# Patient Record
Sex: MALE | Race: WHITE | Employment: FULL TIME | URBAN - METROPOLITAN AREA
[De-identification: names, ages, dates, MRNs, and addresses within clinical notes are randomized per-mention and may not be internally consistent; named-entity substitution may affect disease eponyms.]

---

## 2017-08-26 ENCOUNTER — APPOINTMENT (OUTPATIENT)
Dept: RADIOLOGY | Facility: MEDICAL CENTER | Age: 42
End: 2017-08-26
Attending: EMERGENCY MEDICINE
Payer: OTHER GOVERNMENT

## 2017-08-26 ENCOUNTER — HOSPITAL ENCOUNTER (EMERGENCY)
Facility: MEDICAL CENTER | Age: 42
End: 2017-08-26
Attending: EMERGENCY MEDICINE
Payer: OTHER GOVERNMENT

## 2017-08-26 VITALS
SYSTOLIC BLOOD PRESSURE: 122 MMHG | RESPIRATION RATE: 18 BRPM | WEIGHT: 220.68 LBS | HEART RATE: 72 BPM | BODY MASS INDEX: 31.59 KG/M2 | OXYGEN SATURATION: 98 % | DIASTOLIC BLOOD PRESSURE: 84 MMHG | TEMPERATURE: 97.6 F | HEIGHT: 70 IN

## 2017-08-26 DIAGNOSIS — S28.0XXA CRUSHING INJURY OF CHEST, INITIAL ENCOUNTER: ICD-10-CM

## 2017-08-26 DIAGNOSIS — S29.9XXA CHEST WALL INJURY, INITIAL ENCOUNTER: ICD-10-CM

## 2017-08-26 PROCEDURE — 99284 EMERGENCY DEPT VISIT MOD MDM: CPT

## 2017-08-26 PROCEDURE — 71260 CT THORAX DX C+: CPT

## 2017-08-26 PROCEDURE — 700117 HCHG RX CONTRAST REV CODE 255: Performed by: EMERGENCY MEDICINE

## 2017-08-26 RX ORDER — HYDROCODONE BITARTRATE AND ACETAMINOPHEN 5; 325 MG/1; MG/1
1-2 TABLET ORAL EVERY 4 HOURS PRN
Qty: 5 TAB | Refills: 0 | Status: SHIPPED | OUTPATIENT
Start: 2017-08-26 | End: 2017-12-06

## 2017-08-26 RX ADMIN — IOHEXOL 75 ML: 350 INJECTION, SOLUTION INTRAVENOUS at 14:19

## 2017-08-26 ASSESSMENT — PAIN SCALES - GENERAL
PAINLEVEL_OUTOF10: 5
PAINLEVEL_OUTOF10: 10

## 2017-08-26 NOTE — ED NOTES
Discharge instructions provided with Rx x 1 with work note per ERP.  Verbalized understanding of follow-up care, medication management & reasons to return to ED.  Released in stable condition.

## 2017-08-26 NOTE — ED NOTES
Chief Complaint   Patient presents with   • Chest Pain     Pt BIB self to triage for c/o dropping weight bar (215lbs) on chest. Pt reports hearing crack, pt reports mild SOB/ pain upon inspiration.   bilateral breath sounds present  Explained to pt triage process, made pt aware to tell this RN of any changes/concerns, pt verbalized understanding of process and instructions given. Pt to ER lobby.

## 2017-08-26 NOTE — ED PROVIDER NOTES
"ED Provider Note    CHIEF COMPLAINT  Chief Complaint   Patient presents with   • Chest Pain     Pt BIB self to triage for c/o dropping weight bar (215lbs) on chest. Pt reports hearing crack, pt reports mild SOB/ pain upon inspiration.       HPI  Amor Nava is a 42 y.o. male who presentsFor evaluation of chest pain. The patient was lifting weights today. He was bench pressing 215 pounds when he lost the  with both of his hands and the bar came down across his chest. He is complaining of anterior chest pain and some shortness of breath. He has no abdominal pain. He denies any other injury. He has no numbness or weakness.    REVIEW OF SYSTEMS  See HPI for further details. All other systems are negative.     PAST MEDICAL HISTORY  No past medical history on file.    FAMILY HISTORY  Family History   Problem Relation Age of Onset   • Alcohol/Drug Mother    • Alcohol/Drug Father        SOCIAL HISTORY  Social History     Social History   • Marital status: Unknown     Spouse name: N/A   • Number of children: N/A   • Years of education: N/A     Social History Main Topics   • Smoking status: Former Smoker   • Smokeless tobacco: Never Used   • Alcohol use Yes      Comment: last 2007, alcohol history   • Drug use: No   • Sexual activity: Yes     Partners: Female     Other Topics Concern   • Not on file     Social History Narrative   • No narrative on file       SURGICAL HISTORY  No past surgical history on file.    CURRENT MEDICATIONS  Home Medications    **Home medications have not yet been reviewed for this encounter**         ALLERGIES  No Known Allergies    PHYSICAL EXAM  VITAL SIGNS: /78   Pulse 94   Temp 36.4 °C (97.6 °F)   Resp 16   Ht 1.778 m (5' 10\")   Wt 100.1 kg (220 lb 10.9 oz)   SpO2 98%   BMI 31.66 kg/m²     Constitutional: Well developed, Well nourished,  Non-toxic appearance.   HENT: Normocephalic, Atraumatic.   Eyes: EOMI, Conjunctiva normal, No discharge.   Cardiovascular: Normal heart rate. "   Thorax & Lungs:No respiratory distress.  Skin: Warm, Dry.   Musculoskeletal: Good range of motion in all major joints.  Neurologic: Awake alert, No focal deficits noted.       RADIOLOGY/PROCEDURES  CT-CHEST (THORAX) WITH   Final Result      1.  No evidence of thoracic injury.      2.  Fatty liver.                  COURSE & MEDICAL DECISION MAKING  Pertinent Labs & Imaging studies reviewed. (See chart for details)  This is a 42-year-old here for evaluation after a crush injury to his chest while lifting weights. CT scan of the chest using trauma protocol is obtained. I reviewed this study. Shows no evidence of acute traumatic injury. I discussed results of the tests with the patient. I have explained that he likely has soft tissue injury to include possible muscle strains or cartilage injury. I recommended anti-inflammatories. I will provide him a prescription for 5 Nork O. He will follow up with his primary care provider as needed. I have reviewed his prescription monitoring report. He is discharged home in stable condition.       FINAL IMPRESSION  1. Crush injury to his chest  2. Chest wall injury  3.         Electronically signed by: Ralf Khoury, 8/26/2017 1:36 PM

## 2017-08-26 NOTE — DISCHARGE INSTRUCTIONS
Blunt Chest Trauma  Blunt chest trauma is an injury caused by a blow to the chest. These chest injuries can be very painful. Blunt chest trauma often results in bruised or broken (fractured) ribs. Most cases of bruised and fractured ribs from blunt chest traumas get better after 1 to 3 weeks of rest and pain medicine. Often, the soft tissue in the chest wall is also injured, causing pain and bruising. Internal organs, such as the heart and lungs, may also be injured. Blunt chest trauma can lead to serious medical problems. This injury requires immediate medical care.  CAUSES   · Motor vehicle collisions.  · Falls.  · Physical violence.  · Sports injuries.  SYMPTOMS   · Chest pain. The pain may be worse when you move or breathe deeply.  · Shortness of breath.  · Lightheadedness.  · Bruising.  · Tenderness.  · Swelling.  DIAGNOSIS   Your caregiver will do a physical exam. X-rays may be taken to look for fractures. However, minor rib fractures may not show up on X-rays until a few days after the injury. If a more serious injury is suspected, further imaging tests may be done. This may include ultrasounds, computed tomography (CT) scans, or magnetic resonance imaging (MRI).  TREATMENT   Treatment depends on the severity of your injury. Your caregiver may prescribe pain medicines and deep breathing exercises.  HOME CARE INSTRUCTIONS  · Limit your activities until you can move around without much pain.  · Do not do any strenuous work until your injury is healed.  · Put ice on the injured area.  ¨ Put ice in a plastic bag.  ¨ Place a towel between your skin and the bag.  ¨ Leave the ice on for 15-20 minutes, 03-04 times a day.  · You may wear a rib belt as directed by your caregiver to reduce pain.  · Practice deep breathing as directed by your caregiver to keep your lungs clear.  · Only take over-the-counter or prescription medicines for pain, fever, or discomfort as directed by your caregiver.  SEEK IMMEDIATE MEDICAL  CARE IF:   · You have increasing pain or shortness of breath.  · You cough up blood.  · You have nausea, vomiting, or abdominal pain.  · You have a fever.  · You feel dizzy, weak, or you faint.  MAKE SURE YOU:  · Understand these instructions.  · Will watch your condition.  · Will get help right away if you are not doing well or get worse.     This information is not intended to replace advice given to you by your health care provider. Make sure you discuss any questions you have with your health care provider.     Document Released: 01/25/2006 Document Revised: 01/08/2016 Document Reviewed: 10/03/2012  [facility] Interactive Patient Education ©2016 [facility]

## 2017-10-27 DIAGNOSIS — F33.1 MODERATE EPISODE OF RECURRENT MAJOR DEPRESSIVE DISORDER (HCC): ICD-10-CM

## 2017-10-30 RX ORDER — BUPROPION HYDROCHLORIDE 150 MG/1
TABLET, EXTENDED RELEASE ORAL
Qty: 60 TAB | Refills: 0 | Status: SHIPPED | OUTPATIENT
Start: 2017-10-30 | End: 2017-12-02 | Stop reason: SDUPTHER

## 2017-11-09 DIAGNOSIS — F33.1 MODERATE EPISODE OF RECURRENT MAJOR DEPRESSIVE DISORDER (HCC): ICD-10-CM

## 2017-11-09 RX ORDER — BUPROPION HYDROCHLORIDE 150 MG/1
TABLET, EXTENDED RELEASE ORAL
Qty: 30 TAB | Refills: 0 | Status: SHIPPED | OUTPATIENT
Start: 2017-11-09 | End: 2017-12-06

## 2017-12-02 DIAGNOSIS — F33.1 MODERATE EPISODE OF RECURRENT MAJOR DEPRESSIVE DISORDER (HCC): ICD-10-CM

## 2017-12-04 RX ORDER — BUPROPION HYDROCHLORIDE 150 MG/1
TABLET, EXTENDED RELEASE ORAL
Qty: 60 TAB | Refills: 0 | Status: SHIPPED | OUTPATIENT
Start: 2017-12-04 | End: 2017-12-06 | Stop reason: SDUPTHER

## 2017-12-06 ENCOUNTER — OFFICE VISIT (OUTPATIENT)
Dept: MEDICAL GROUP | Facility: MEDICAL CENTER | Age: 42
End: 2017-12-06
Payer: OTHER GOVERNMENT

## 2017-12-06 VITALS
HEART RATE: 79 BPM | BODY MASS INDEX: 31.92 KG/M2 | OXYGEN SATURATION: 93 % | SYSTOLIC BLOOD PRESSURE: 112 MMHG | WEIGHT: 223 LBS | RESPIRATION RATE: 16 BRPM | HEIGHT: 70 IN | DIASTOLIC BLOOD PRESSURE: 72 MMHG | TEMPERATURE: 97.2 F

## 2017-12-06 DIAGNOSIS — F33.1 MODERATE EPISODE OF RECURRENT MAJOR DEPRESSIVE DISORDER (HCC): ICD-10-CM

## 2017-12-06 PROCEDURE — 99213 OFFICE O/P EST LOW 20 MIN: CPT | Performed by: NURSE PRACTITIONER

## 2017-12-06 RX ORDER — BUPROPION HYDROCHLORIDE 150 MG/1
150 TABLET, EXTENDED RELEASE ORAL 2 TIMES DAILY
Qty: 180 TAB | Refills: 3 | Status: SHIPPED | OUTPATIENT
Start: 2017-12-06 | End: 2019-01-18 | Stop reason: SDUPTHER

## 2017-12-06 ASSESSMENT — PATIENT HEALTH QUESTIONNAIRE - PHQ9: CLINICAL INTERPRETATION OF PHQ2 SCORE: 0

## 2017-12-06 ASSESSMENT — ENCOUNTER SYMPTOMS: DEPRESSION: 1

## 2017-12-06 NOTE — PROGRESS NOTES
"Subjective:      Amor Nava is a 42 y.o. male who presents with Medication Refill (bupropion med)            HPI Amor Nava Is here today for yearly follow-up on depression.    Patient reports he has been taking his Wellbutrin twice a day since he was last seen here a year ago. He feels it helps keep his mood stable and decreases his depression. He would like to stay on the medicine. 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 He has no other complaints today.        Social History   Substance Use Topics   • Smoking status: Former Smoker   • Smokeless tobacco: Never Used   • Alcohol use Yes      Comment: last 2007, alcohol history     Current Outpatient Prescriptions   Medication Sig Dispense Refill   • buPROPion SR (WELLBUTRIN-SR) 150 MG TABLET SR 12 HR sustained-release tablet Take 1 Tab by mouth 2 times a day. 180 Tab 3     No current facility-administered medications for this visit.      Family History   Problem Relation Age of Onset   • Alcohol/Drug Mother    • Alcohol/Drug Father    History reviewed. No pertinent past medical history.    Review of Systems   Psychiatric/Behavioral: Positive for depression.   All other systems reviewed and are negative.         Objective:     /72   Pulse 79   Temp 36.2 °C (97.2 °F)   Resp 16   Ht 1.778 m (5' 10\")   Wt 101.2 kg (223 lb)   SpO2 93%   BMI 32.00 kg/m²      Physical Exam   Constitutional: He is oriented to person, place, and time. He appears well-developed and well-nourished. No distress.   HENT:   Head: Normocephalic and atraumatic.   Right Ear: External ear normal.   Left Ear: External ear normal.   Nose: Nose normal.   Mouth/Throat: Oropharynx is clear and moist.   Eyes: Conjunctivae are normal. Right eye exhibits no discharge. Left eye exhibits no discharge.   Neck: Normal range of motion. Neck supple. No tracheal deviation present. No thyromegaly " present.   Cardiovascular: Normal rate, regular rhythm and normal heart sounds.    No murmur heard.  Pulmonary/Chest: Effort normal and breath sounds normal. No respiratory distress. He has no wheezes. He has no rales.   Lymphadenopathy:     He has no cervical adenopathy.   Neurological: He is alert and oriented to person, place, and time. Coordination normal.   Skin: Skin is warm and dry. No rash noted. He is not diaphoretic. No erythema.   Psychiatric: He has a normal mood and affect. His behavior is normal. Judgment and thought content normal.   Nursing note and vitals reviewed.              Assessment/Plan:     1. Moderate episode of recurrent major depressive disorder (CMS-HCC)  Patient is doing well on antidepressant medication and wishes to continue on this. I have refilled his medicine for the year. He does not feel he needs counseling and denies suicidal or homicidal ideation. I did discuss with him the option of getting lab work done but he declined.  - buPROPion SR (WELLBUTRIN-SR) 150 MG TABLET SR 12 HR sustained-release tablet; Take 1 Tab by mouth 2 times a day.  Dispense: 180 Tab; Refill: 3

## 2019-03-11 DIAGNOSIS — F33.1 MODERATE EPISODE OF RECURRENT MAJOR DEPRESSIVE DISORDER (HCC): ICD-10-CM

## 2019-03-11 RX ORDER — BUPROPION HYDROCHLORIDE 150 MG/1
TABLET, EXTENDED RELEASE ORAL
Qty: 30 TAB | Refills: 0 | Status: SHIPPED | OUTPATIENT
Start: 2019-03-11 | End: 2019-06-18 | Stop reason: SDUPTHER

## 2019-03-12 ENCOUNTER — TELEPHONE (OUTPATIENT)
Dept: MEDICAL GROUP | Facility: MEDICAL CENTER | Age: 44
End: 2019-03-12

## 2019-03-20 DIAGNOSIS — F33.1 MODERATE EPISODE OF RECURRENT MAJOR DEPRESSIVE DISORDER (HCC): ICD-10-CM

## 2019-03-25 ENCOUNTER — TELEPHONE (OUTPATIENT)
Dept: MEDICAL GROUP | Facility: MEDICAL CENTER | Age: 44
End: 2019-03-25

## 2019-04-08 ENCOUNTER — TELEPHONE (OUTPATIENT)
Dept: MEDICAL GROUP | Facility: MEDICAL CENTER | Age: 44
End: 2019-04-08

## 2019-06-18 DIAGNOSIS — F33.1 MODERATE EPISODE OF RECURRENT MAJOR DEPRESSIVE DISORDER (HCC): ICD-10-CM

## 2019-06-18 RX ORDER — BUPROPION HYDROCHLORIDE 150 MG/1
TABLET, EXTENDED RELEASE ORAL
Qty: 30 TAB | Refills: 0 | Status: SHIPPED | OUTPATIENT
Start: 2019-06-18 | End: 2019-06-21 | Stop reason: SDUPTHER

## 2019-06-18 NOTE — TELEPHONE ENCOUNTER
From: Amor Nava  Sent: 6/18/2019 8:44 AM PDT  Subject: Medication Renewal Request    Amor Nava would like a refill of the following medications:     buPROPion SR (WELLBUTRIN-SR) 150 MG TABLET SR 12 HR sustained-release tablet [Scottie Apodaca, A.P.N.]    Preferred pharmacy: [facility] #[id number] - [place], [place] - [address]

## 2019-06-21 ENCOUNTER — OFFICE VISIT (OUTPATIENT)
Dept: MEDICAL GROUP | Facility: MEDICAL CENTER | Age: 44
End: 2019-06-21
Payer: OTHER GOVERNMENT

## 2019-06-21 VITALS
HEART RATE: 77 BPM | RESPIRATION RATE: 16 BRPM | WEIGHT: 210 LBS | BODY MASS INDEX: 30.06 KG/M2 | SYSTOLIC BLOOD PRESSURE: 114 MMHG | TEMPERATURE: 97.8 F | DIASTOLIC BLOOD PRESSURE: 72 MMHG | HEIGHT: 70 IN | OXYGEN SATURATION: 100 %

## 2019-06-21 DIAGNOSIS — L91.8 SKIN TAGS, MULTIPLE ACQUIRED: ICD-10-CM

## 2019-06-21 DIAGNOSIS — H00.012 HORDEOLUM EXTERNUM OF RIGHT LOWER EYELID: ICD-10-CM

## 2019-06-21 DIAGNOSIS — F33.1 MODERATE EPISODE OF RECURRENT MAJOR DEPRESSIVE DISORDER (HCC): ICD-10-CM

## 2019-06-21 DIAGNOSIS — E66.9 OBESITY (BMI 30-39.9): ICD-10-CM

## 2019-06-21 PROCEDURE — 99214 OFFICE O/P EST MOD 30 MIN: CPT | Performed by: NURSE PRACTITIONER

## 2019-06-21 RX ORDER — CIPROFLOXACIN HYDROCHLORIDE 3.5 MG/ML
1 SOLUTION/ DROPS TOPICAL
Qty: 1 BOTTLE | Refills: 0 | Status: SHIPPED | OUTPATIENT
Start: 2019-06-21 | End: 2020-09-10

## 2019-06-21 RX ORDER — BUPROPION HYDROCHLORIDE 150 MG/1
TABLET, EXTENDED RELEASE ORAL
Qty: 180 TAB | Refills: 3 | Status: SHIPPED | OUTPATIENT
Start: 2019-06-21 | End: 2020-06-29

## 2019-06-21 ASSESSMENT — ENCOUNTER SYMPTOMS
EYE DISCHARGE: 1
DEPRESSION: 1

## 2019-06-21 NOTE — PROGRESS NOTES
Subjective:      Amor Nava is a 44 y.o. male who presents with Nevus (in his back/ becoming tender)        CC: Patient here today requesting refills on his depression medicine as well as concerns about a stye of the right eye and multiple skin growths.      HPI Amor Nava      1. Moderate episode of recurrent major depressive disorder (HCC)  Patient on Wellbutrin 150 mg twice a day which he feels is helpful for his depression but he has been running out because he has not been seen at the office since December 2017 and no showed twice.  He does not feel that he needs to see a counselor.  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    2. Skin tags, multiple acquired  Patient reports multiple growths on mostly his back which she would like to have removed and checked.  They have been there for a while.    3. Hordeolum externum of right lower eyelid  Patient reports of irritation of the right lower eyelid over the past few days with redness possible discharge.  He has had no vision changes.    4. Obesity (BMI 30-39.9)  Patient reports he has lost about 12 pounds in the last year but continues to be overweight.  Social History   Substance Use Topics   • Smoking status: Former Smoker   • Smokeless tobacco: Never Used   • Alcohol use Yes      Comment: last 2007, alcohol history     Current Outpatient Prescriptions   Medication Sig Dispense Refill   • buPROPion SR (WELLBUTRIN-SR) 150 MG TABLET SR 12 HR sustained-release tablet TAKE 1 TABLET BY MOUTH TWICE A  Tab 3   • ciprofloxacin (CILOXIN) 0.3 % Solution Place 1 Drop in right eye every 2 hours. 1 Bottle 0     No current facility-administered medications for this visit.    History reviewed. No pertinent past medical history.   Family History   Problem Relation Age of Onset   • Alcohol/Drug Mother    • Alcohol/Drug Father        Review of Systems   Eyes: Positive for discharge.   Skin: Positive for rash.   Psychiatric/Behavioral: Positive for  "depression.   All other systems reviewed and are negative.         Objective:     /72 (BP Location: Right arm, Patient Position: Sitting, BP Cuff Size: Adult)   Pulse 77   Temp 36.6 °C (97.8 °F) (Temporal)   Resp 16   Ht 1.778 m (5' 10\")   Wt 95.3 kg (210 lb)   SpO2 100%   BMI 30.13 kg/m²      Physical Exam   Constitutional: He is oriented to person, place, and time. He appears well-developed and well-nourished. No distress.   HENT:   Head: Normocephalic and atraumatic.   Right Ear: External ear normal.   Left Ear: External ear normal.   Nose: Nose normal.   Mouth/Throat: Oropharynx is clear and moist.   Eyes: Conjunctivae are normal. Right eye exhibits no discharge. Left eye exhibits no discharge.   Small irritation of the right lower leg.   Neck: Normal range of motion. Neck supple. No tracheal deviation present. No thyromegaly present.   Cardiovascular: Normal rate, regular rhythm and normal heart sounds.    No murmur heard.  Pulmonary/Chest: Effort normal and breath sounds normal. No respiratory distress. He has no wheezes. He has no rales.   Lymphadenopathy:     He has no cervical adenopathy.   Neurological: He is alert and oriented to person, place, and time. Coordination normal.   Skin: Skin is warm and dry. No rash noted. He is not diaphoretic. No erythema.   There are multiple skin growths mostly on the back.   Psychiatric: He has a normal mood and affect. His behavior is normal. Judgment and thought content normal.   Patient is pleasant and talkative in the office and does not appear depressed.   Nursing note and vitals reviewed.              Assessment/Plan:     1. Moderate episode of recurrent major depressive disorder (HCC)  Patient declined referral to counseling but would like to stay on the Wellbutrin.  I did explain that he needs to come in at least yearly to prevent running out of medication.  - buPROPion SR (WELLBUTRIN-SR) 150 MG TABLET SR 12 HR sustained-release tablet; TAKE 1 TABLET " BY MOUTH TWICE A DAY  Dispense: 180 Tab; Refill: 3    2. Skin tags, multiple acquired  Patient will be referred to dermatology for possible removal and biopsy.  - REFERRAL TO DERMATOLOGY    3. Hordeolum externum of right lower eyelid  Patient will start on Cipro eyedrops for a few days but will need to follow with ophthalmology if it does not improve.  - ciprofloxacin (CILOXIN) 0.3 % Solution; Place 1 Drop in right eye every 2 hours.  Dispense: 1 Bottle; Refill: 0    4. Obesity (BMI 30-39.9)    - Patient identified as having weight management issue.  Appropriate orders and counseling given.

## 2020-07-31 DIAGNOSIS — F33.1 MODERATE EPISODE OF RECURRENT MAJOR DEPRESSIVE DISORDER (HCC): ICD-10-CM

## 2020-07-31 RX ORDER — BUPROPION HYDROCHLORIDE 150 MG/1
TABLET, EXTENDED RELEASE ORAL
Qty: 60 TAB | Refills: 0 | Status: SHIPPED | OUTPATIENT
Start: 2020-07-31 | End: 2020-09-03

## 2020-09-10 ENCOUNTER — OFFICE VISIT (OUTPATIENT)
Dept: MEDICAL GROUP | Facility: MEDICAL CENTER | Age: 45
End: 2020-09-10
Payer: COMMERCIAL

## 2020-09-10 VITALS
DIASTOLIC BLOOD PRESSURE: 76 MMHG | OXYGEN SATURATION: 97 % | BODY MASS INDEX: 27.06 KG/M2 | SYSTOLIC BLOOD PRESSURE: 124 MMHG | RESPIRATION RATE: 16 BRPM | WEIGHT: 189 LBS | HEART RATE: 89 BPM | TEMPERATURE: 98 F | HEIGHT: 70 IN

## 2020-09-10 DIAGNOSIS — F33.1 MODERATE EPISODE OF RECURRENT MAJOR DEPRESSIVE DISORDER (HCC): ICD-10-CM

## 2020-09-10 DIAGNOSIS — L98.9 SKIN LESION: ICD-10-CM

## 2020-09-10 PROCEDURE — 99213 OFFICE O/P EST LOW 20 MIN: CPT | Performed by: NURSE PRACTITIONER

## 2020-09-10 RX ORDER — BUPROPION HYDROCHLORIDE 150 MG/1
150 TABLET ORAL EVERY MORNING
Qty: 90 TAB | Refills: 3 | Status: SHIPPED | OUTPATIENT
Start: 2020-09-10 | End: 2020-09-10 | Stop reason: SDUPTHER

## 2020-09-10 RX ORDER — BUPROPION HYDROCHLORIDE 150 MG/1
150 TABLET ORAL EVERY MORNING
Qty: 90 TAB | Refills: 3 | Status: SHIPPED | OUTPATIENT
Start: 2020-09-10 | End: 2021-10-10

## 2020-09-10 RX ORDER — BUPROPION HYDROCHLORIDE 150 MG/1
TABLET, EXTENDED RELEASE ORAL
Qty: 180 TAB | Refills: 3 | Status: CANCELLED | OUTPATIENT
Start: 2020-09-10

## 2020-09-10 ASSESSMENT — ENCOUNTER SYMPTOMS: DEPRESSION: 1

## 2020-09-10 NOTE — PROGRESS NOTES
Subjective:      Amor Nava is a 45 y.o. male who presents with Referral Needed (dermatology)      CC: Patient here today for yearly follow-up on depression as well as concern about a skin lesion on his forehead.      1. Moderate episode of recurrent major depressive disorder (HCC)  Patient has been using short acting Wellbutrin 150 mg twice daily and finds it helpful and wishes to continue although he does sometimes forget to take his evening dose.  He would prefer a 1 time a day dosing.  He states his depression is no better or worse.  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    2. Skin lesion  Patient has an area on his forehead which he states has changed in shape and size.  He states he has had it for years.  He has been treating with tea tree oil and alcohol.      History reviewed. No pertinent past medical history.  Social History     Socioeconomic History   • Marital status: Unknown     Spouse name: Not on file   • Number of children: Not on file   • Years of education: Not on file   • Highest education level: Not on file   Occupational History   • Not on file   Social Needs   • Financial resource strain: Not on file   • Food insecurity     Worry: Not on file     Inability: Not on file   • Transportation needs     Medical: Not on file     Non-medical: Not on file   Tobacco Use   • Smoking status: Former Smoker   • Smokeless tobacco: Never Used   Substance and Sexual Activity   • Alcohol use: Yes     Comment: last 2007, alcohol history   • Drug use: No   • Sexual activity: Yes     Partners: Female   Lifestyle   • Physical activity     Days per week: Not on file     Minutes per session: Not on file   • Stress: Not on file   Relationships   • Social connections     Talks on phone: Not on file     Gets together: Not on file     Attends Zoroastrianism service: Not on file     Active member of club or organization: Not on file     Attends meetings of clubs or organizations: Not  "on file     Relationship status: Not on file   • Intimate partner violence     Fear of current or ex partner: Not on file     Emotionally abused: Not on file     Physically abused: Not on file     Forced sexual activity: Not on file   Other Topics Concern   • Not on file   Social History Narrative   • Not on file     Current Outpatient Medications   Medication Sig Dispense Refill   • buPROPion (WELLBUTRIN XL) 150 MG XL tablet Take 1 Tab by mouth every morning. 90 Tab 3     No current facility-administered medications for this visit.      Family History   Problem Relation Age of Onset   • Alcohol/Drug Mother    • Alcohol/Drug Father          Review of Systems   Skin: Positive for rash.   Psychiatric/Behavioral: Positive for depression.   All other systems reviewed and are negative.         Objective:     /76 (BP Location: Right arm, Patient Position: Sitting)   Pulse 89   Temp 36.7 °C (98 °F) (Temporal)   Resp 16   Ht 1.778 m (5' 10\")   Wt 85.7 kg (189 lb)   SpO2 97%   BMI 27.12 kg/m²      Physical Exam  Vitals signs and nursing note reviewed.   Constitutional:       General: He is not in acute distress.     Appearance: He is well-developed. He is not diaphoretic.   HENT:      Head: Normocephalic and atraumatic.      Right Ear: External ear normal.      Left Ear: External ear normal.      Nose: Nose normal.   Eyes:      General:         Right eye: No discharge.         Left eye: No discharge.      Conjunctiva/sclera: Conjunctivae normal.   Neck:      Musculoskeletal: Normal range of motion and neck supple.      Thyroid: No thyromegaly.      Trachea: No tracheal deviation.   Cardiovascular:      Rate and Rhythm: Normal rate and regular rhythm.      Heart sounds: Normal heart sounds. No murmur.   Pulmonary:      Effort: Pulmonary effort is normal. No respiratory distress.      Breath sounds: Normal breath sounds. No wheezing or rales.   Lymphadenopathy:      Cervical: No cervical adenopathy.   Skin:     " General: Skin is warm and dry.      Findings: Rash present. No erythema.      Comments: Flat, discolored area on forehead for which she has a Band-Aid over the area.  Multiple skin tag-like areas on the back.   Neurological:      Mental Status: He is alert and oriented to person, place, and time.      Coordination: Coordination normal.   Psychiatric:         Behavior: Behavior normal.         Thought Content: Thought content normal.         Judgment: Judgment normal.                 Assessment/Plan:        1. Moderate episode of recurrent major depressive disorder (HCC)  Patient feels he is doing well with Wellbutrin but I will switch him over to the once a day dosing which may make him more compliant since he has been missing his evening dosing.  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  - buPROPion (WELLBUTRIN XL) 150 MG XL tablet; Take 1 Tab by mouth every morning.  Dispense: 90 Tab; Refill: 3    2. Skin lesion  Patient will be referred to dermatology for the spot on his forehead and he may wish to talk to him about skin tag removals on his back.  - REFERRAL TO DERMATOLOGY

## 2020-12-22 DIAGNOSIS — Z23 NEED FOR VACCINATION: ICD-10-CM

## 2021-08-17 ENCOUNTER — HOSPITAL ENCOUNTER (EMERGENCY)
Dept: HOSPITAL 8 - ED | Age: 46
Discharge: HOME | End: 2021-08-17
Payer: COMMERCIAL

## 2021-08-17 VITALS — SYSTOLIC BLOOD PRESSURE: 125 MMHG | DIASTOLIC BLOOD PRESSURE: 83 MMHG

## 2021-08-17 VITALS — WEIGHT: 180.78 LBS | BODY MASS INDEX: 25.88 KG/M2 | HEIGHT: 70 IN

## 2021-08-17 DIAGNOSIS — Z20.822: Primary | ICD-10-CM

## 2021-08-17 PROCEDURE — 99283 EMERGENCY DEPT VISIT LOW MDM: CPT

## 2021-08-17 PROCEDURE — U0003 INFECTIOUS AGENT DETECTION BY NUCLEIC ACID (DNA OR RNA); SEVERE ACUTE RESPIRATORY SYNDROME CORONAVIRUS 2 (SARS-COV-2) (CORONAVIRUS DISEASE [COVID-19]), AMPLIFIED PROBE TECHNIQUE, MAKING USE OF HIGH THROUGHPUT TECHNOLOGIES AS DESCRIBED BY CMS-2020-01-R: HCPCS

## 2021-10-07 DIAGNOSIS — F33.1 MODERATE EPISODE OF RECURRENT MAJOR DEPRESSIVE DISORDER (HCC): ICD-10-CM

## 2021-10-10 RX ORDER — BUPROPION HYDROCHLORIDE 150 MG/1
TABLET ORAL
Qty: 90 TABLET | Refills: 0 | Status: SHIPPED | OUTPATIENT
Start: 2021-10-10